# Patient Record
Sex: FEMALE | Race: WHITE | NOT HISPANIC OR LATINO | URBAN - NONMETROPOLITAN AREA
[De-identification: names, ages, dates, MRNs, and addresses within clinical notes are randomized per-mention and may not be internally consistent; named-entity substitution may affect disease eponyms.]

---

## 2017-09-12 ENCOUNTER — TRANSCRIBE ORDERS (OUTPATIENT)
Dept: URGENT CARE | Facility: CLINIC | Age: 47
End: 2017-09-12

## 2017-09-12 ENCOUNTER — APPOINTMENT (OUTPATIENT)
Dept: LAB | Facility: CLINIC | Age: 47
End: 2017-09-12

## 2017-09-12 DIAGNOSIS — Z02.1 PRE-EMPLOYMENT EXAMINATION: ICD-10-CM

## 2017-09-12 DIAGNOSIS — Z02.1 PRE-EMPLOYMENT EXAMINATION: Primary | ICD-10-CM

## 2017-09-12 LAB — RUBV IGG SERPL IA-ACNC: 58.8 IU/ML

## 2017-09-12 PROCEDURE — 86706 HEP B SURFACE ANTIBODY: CPT

## 2017-09-12 PROCEDURE — 86735 MUMPS ANTIBODY: CPT

## 2017-09-12 PROCEDURE — 86480 TB TEST CELL IMMUN MEASURE: CPT

## 2017-09-12 PROCEDURE — 36415 COLL VENOUS BLD VENIPUNCTURE: CPT

## 2017-09-12 PROCEDURE — 86787 VARICELLA-ZOSTER ANTIBODY: CPT

## 2017-09-12 PROCEDURE — 86762 RUBELLA ANTIBODY: CPT

## 2017-09-12 PROCEDURE — 86765 RUBEOLA ANTIBODY: CPT

## 2017-09-13 LAB — HBV SURFACE AB SER-ACNC: >1000 MIU/ML

## 2017-09-14 LAB
ANNOTATION COMMENT IMP: NORMAL
GAMMA INTERFERON BACKGROUND BLD IA-ACNC: 0.21 IU/ML
M TB IFN-G BLD-IMP: NEGATIVE
M TB IFN-G CD4+ BCKGRND COR BLD-ACNC: <0.01 IU/ML
M TB IFN-G CD4+ T-CELLS BLD-ACNC: 0.06 IU/ML
MEV IGG SER QL: NORMAL
MITOGEN IGNF BLD-ACNC: 7.01 IU/ML
MUV IGG SER QL: NORMAL
QUANTIFERON-TB GOLD IN TUBE: NORMAL
SERVICE CMNT-IMP: NORMAL
VZV IGG SER IA-ACNC: NORMAL

## 2017-09-15 ENCOUNTER — GENERIC CONVERSION - ENCOUNTER (OUTPATIENT)
Dept: OTHER | Facility: OTHER | Age: 47
End: 2017-09-15

## 2018-01-11 NOTE — RESULT NOTES
Verified Results  (1) RUBELLA IMMUNITY 12Sep2017 05:09PM [facility]     Test Name Result Flag Reference   RUBELLA (IGG) 58 8 IU/mL  >9 9   Rubella IgG       <5 0 IU/mL     Negative: not immune      5 0-9 9 IU/mL  Equivocal     >9 9 IU/mL     Positive: immune     (1) HEP B SURFACE ANTIBODY [id number] 05:09PM [facility]     Test Name Result Flag Reference   HEPATITIS B SURFACE ANTIBODY >1000 00 mIU/mL     Protective Immunity: Hep B Surface Antibody >= 10 mIu/ml (Traceable to [facility] International Reference Preparation)     (1) QUANTIFERON - TB GOLD 12Sep2017 05:09PM [facility]     Test Name Result Flag Reference   QUANTIFERON TB GOLD      Specimen incubated at [place], [place]    Performed at:  [address] [place] [address]  [id number]  : Deepthi Hand MD, Phone:  [phone]   QUANTIFERON TB GOLD Negative  Negative   QUANTIFERON CRITERIA Comment     To be considered positive a specimen should have a TB Ag minus Nil  value greater than or equal to 0 35 IU/mL and in addition the TB Ag  minus Nil value must be greater than or equal to 25% of the Nil  value  There may be insufficient information in these values to  differentiate between some negative and some indeterminate test  values  QUANTIFERON TB AG VALUE 0 06 IU/mL     QUANTIFERON NIL VALUE 0 21 IU/mL     QUANTIFERON MITOGEN VALUE 7 01 IU/mL     QFT TB AG MINUS NIL VALUE <0 01 IU/mL     QFT INTERPRETATION Comment     The QuantiFERON TB Gold (in Tube) assay is intended for use as an aid  in the diagnosis of TB infection  Negative results suggest that there  is no TB infection  In patients with high suspicion of exposure, a  negative test should be repeated  A positive test indicates infection  with Mycobacterium tuberculosis  Among individuals without  tuberculosis infection, a positive test may be due to exposure to  [place], M  anderson or M  marinum  On the Internet, go to  [facility] gov/tb for further details    Performed at:  [address]  [id number]  : Lynda Kern MD, Phone:  [phone]     (1) RUBEOLA ANTIBODIES, IGG [id number] 05:09PM [facility]     Test Name Result Flag Reference   Rubeola AB, IgG IMMUNE  IMMUNE   Interpretation:    IMMUNE     - Presumed immune to Measles infection  EQUIVOCAL  - Suggest repeat in 2-4 weeks  NON-IMMUNE - Presumed non-immune to Measles infection  (1) MUMPS  ANTIBODIES, IGG [id number] 05:09PM [facility]     Test Name Result Flag Reference   MUMPS IgG IMMUNE  IMMUNE   IMMUNE - Presumed immune to mumps infection  INTERPRETATION:    IMMUNE     - Presumed immune to Mumps IgG infection  EQUIVOCAL  - Suggest repeat testing in 2-4 weeks  NON-IMMUNE - Presumed non-immune to Mumps IgG infection  (1) VARICELLA ZOSTER IMMUNITY(IGG) [id number] 05:09PM [facility]     Test Name Result Flag Reference   ALEXY ZOSTER IGG IMMUNE  IMMUNE   INTERPRETATION:    IMMUNE     - Presumed immune to VZV IgG infection  EQUIVOCAL  - Suggest repeat testing in 2-4 weeks  NON-IMMUNE - Presumed non-immune to VZV IgG infection

## 2018-07-10 ENCOUNTER — TELEPHONE (OUTPATIENT)
Dept: OBGYN CLINIC | Facility: CLINIC | Age: 48
End: 2018-07-10